# Patient Record
Sex: FEMALE | Race: WHITE | HISPANIC OR LATINO | Employment: UNEMPLOYED | URBAN - METROPOLITAN AREA
[De-identification: names, ages, dates, MRNs, and addresses within clinical notes are randomized per-mention and may not be internally consistent; named-entity substitution may affect disease eponyms.]

---

## 2022-12-13 ENCOUNTER — HOSPITAL ENCOUNTER (EMERGENCY)
Facility: HOSPITAL | Age: 32
Discharge: HOME | End: 2022-12-13
Attending: EMERGENCY MEDICINE

## 2022-12-13 ENCOUNTER — APPOINTMENT (EMERGENCY)
Dept: CT IMAGING | Facility: HOSPITAL | Age: 32
End: 2022-12-13

## 2022-12-13 VITALS
DIASTOLIC BLOOD PRESSURE: 76 MMHG | RESPIRATION RATE: 16 BRPM | SYSTOLIC BLOOD PRESSURE: 118 MMHG | HEART RATE: 80 BPM | OXYGEN SATURATION: 100 % | TEMPERATURE: 98.7 F

## 2022-12-13 DIAGNOSIS — R31.29 MICROSCOPIC HEMATURIA: ICD-10-CM

## 2022-12-13 DIAGNOSIS — N20.0 RIGHT RENAL STONE: Primary | ICD-10-CM

## 2022-12-13 DIAGNOSIS — K76.0 FATTY LIVER: ICD-10-CM

## 2022-12-13 DIAGNOSIS — R10.9 RIGHT FLANK PAIN: ICD-10-CM

## 2022-12-13 LAB
ALBUMIN SERPL BCP-MCNC: 4.1 G/DL (ref 3.5–5)
ALP SERPL-CCNC: 72 U/L (ref 46–116)
ALT SERPL W P-5'-P-CCNC: 42 U/L (ref 12–78)
ANION GAP SERPL CALCULATED.3IONS-SCNC: 6 MMOL/L (ref 4–13)
AST SERPL W P-5'-P-CCNC: 22 U/L (ref 5–45)
BACTERIA UR QL AUTO: ABNORMAL /HPF
BASOPHILS # BLD AUTO: 0.05 THOUSANDS/ÂΜL (ref 0–0.1)
BASOPHILS NFR BLD AUTO: 1 % (ref 0–1)
BILIRUB SERPL-MCNC: 0.25 MG/DL (ref 0.2–1)
BILIRUB UR QL STRIP: NEGATIVE
BUN SERPL-MCNC: 12 MG/DL (ref 5–25)
CALCIUM SERPL-MCNC: 9.1 MG/DL (ref 8.3–10.1)
CHLORIDE SERPL-SCNC: 102 MMOL/L (ref 96–108)
CLARITY UR: CLEAR
CO2 SERPL-SCNC: 29 MMOL/L (ref 21–32)
COLOR UR: YELLOW
CREAT SERPL-MCNC: 0.97 MG/DL (ref 0.6–1.3)
EOSINOPHIL # BLD AUTO: 0.18 THOUSAND/ÂΜL (ref 0–0.61)
EOSINOPHIL NFR BLD AUTO: 2 % (ref 0–6)
ERYTHROCYTE [DISTWIDTH] IN BLOOD BY AUTOMATED COUNT: 13.7 % (ref 11.6–15.1)
EXT PREGNANCY TEST URINE: NEGATIVE
EXT. CONTROL: NORMAL
GFR SERPL CREATININE-BSD FRML MDRD: 76 ML/MIN/1.73SQ M
GLUCOSE SERPL-MCNC: 84 MG/DL (ref 65–140)
GLUCOSE UR STRIP-MCNC: NEGATIVE MG/DL
HCT VFR BLD AUTO: 38.6 % (ref 34.8–46.1)
HGB BLD-MCNC: 12.4 G/DL (ref 11.5–15.4)
HGB UR QL STRIP.AUTO: ABNORMAL
HYALINE CASTS #/AREA URNS LPF: ABNORMAL /LPF
IMM GRANULOCYTES # BLD AUTO: 0.02 THOUSAND/UL (ref 0–0.2)
IMM GRANULOCYTES NFR BLD AUTO: 0 % (ref 0–2)
KETONES UR STRIP-MCNC: NEGATIVE MG/DL
LEUKOCYTE ESTERASE UR QL STRIP: ABNORMAL
LYMPHOCYTES # BLD AUTO: 2.29 THOUSANDS/ÂΜL (ref 0.6–4.47)
LYMPHOCYTES NFR BLD AUTO: 29 % (ref 14–44)
MCH RBC QN AUTO: 27.7 PG (ref 26.8–34.3)
MCHC RBC AUTO-ENTMCNC: 32.1 G/DL (ref 31.4–37.4)
MCV RBC AUTO: 86 FL (ref 82–98)
MONOCYTES # BLD AUTO: 0.63 THOUSAND/ÂΜL (ref 0.17–1.22)
MONOCYTES NFR BLD AUTO: 8 % (ref 4–12)
MUCOUS THREADS UR QL AUTO: ABNORMAL
NEUTROPHILS # BLD AUTO: 4.74 THOUSANDS/ÂΜL (ref 1.85–7.62)
NEUTS SEG NFR BLD AUTO: 60 % (ref 43–75)
NITRITE UR QL STRIP: NEGATIVE
NON-SQ EPI CELLS URNS QL MICRO: ABNORMAL /HPF
NRBC BLD AUTO-RTO: 0 /100 WBCS
PH UR STRIP.AUTO: 6.5 [PH]
PLATELET # BLD AUTO: 362 THOUSANDS/UL (ref 149–390)
PMV BLD AUTO: 8.9 FL (ref 8.9–12.7)
POTASSIUM SERPL-SCNC: 3.9 MMOL/L (ref 3.5–5.3)
PROT SERPL-MCNC: 8.2 G/DL (ref 6.4–8.4)
PROT UR STRIP-MCNC: ABNORMAL MG/DL
RBC # BLD AUTO: 4.48 MILLION/UL (ref 3.81–5.12)
RBC #/AREA URNS AUTO: ABNORMAL /HPF
SODIUM SERPL-SCNC: 137 MMOL/L (ref 135–147)
SP GR UR STRIP.AUTO: 1.03 (ref 1–1.03)
UROBILINOGEN UR STRIP-ACNC: <2 MG/DL
WBC # BLD AUTO: 7.91 THOUSAND/UL (ref 4.31–10.16)
WBC #/AREA URNS AUTO: ABNORMAL /HPF

## 2022-12-13 RX ADMIN — SODIUM CHLORIDE 1000 ML: 0.9 INJECTION, SOLUTION INTRAVENOUS at 10:52

## 2022-12-13 NOTE — ED PROVIDER NOTES
History  Chief Complaint   Patient presents with   • Flank Pain     Right flank pain on and off since June  Hx of same  +nausea     31yo female with no significant past medical history presenting for evaluation of right flank pain  She has been having pain on and off for the past 6 months  Pain seems to worsen if she is dehydrated  Pain is also worse with movement  Pain comes and goes every day or two  She is worried that she has a kidney stone because she has passed two in the past  She also reports that her urine has an odor to it  No fevers, chills, vomiting, abdominal pain  History provided by:  Patient   used: No    Flank Pain  Pain location:  R flank  Pain quality: aching    Pain radiates to:  Does not radiate  Pain severity:  Moderate  Onset quality:  Gradual  Duration: 6 months  Timing:  Intermittent  Progression:  Worsening  Chronicity:  New  Relieved by:  Nothing  Exacerbated by: Being dehydrated  Associated symptoms: nausea    Associated symptoms: no chest pain, no chills, no constipation, no diarrhea, no dysuria, no fever, no shortness of breath and no vomiting        None       History reviewed  No pertinent past medical history  History reviewed  No pertinent surgical history  History reviewed  No pertinent family history  I have reviewed and agree with the history as documented  E-Cigarette/Vaping     E-Cigarette/Vaping Substances     Social History     Tobacco Use   • Smoking status: Never   • Smokeless tobacco: Never   Substance Use Topics   • Alcohol use: Not Currently   • Drug use: Never       Review of Systems   Constitutional: Negative for chills and fever  HENT: Negative for drooling and voice change  Eyes: Negative for discharge and redness  Respiratory: Negative for shortness of breath and stridor  Cardiovascular: Negative for chest pain and leg swelling  Gastrointestinal: Positive for nausea  Negative for constipation, diarrhea and vomiting  Genitourinary: Positive for flank pain  Negative for dysuria  +urine odor   Musculoskeletal: Negative for neck pain and neck stiffness  Skin: Negative for color change and rash  Neurological: Negative for seizures and syncope  Psychiatric/Behavioral: Negative for confusion  The patient is not nervous/anxious  All other systems reviewed and are negative  Physical Exam  Physical Exam  Vitals and nursing note reviewed  Constitutional:       General: She is not in acute distress  Appearance: She is well-developed  She is not diaphoretic  HENT:      Head: Normocephalic and atraumatic  Right Ear: External ear normal       Left Ear: External ear normal       Nose: Nose normal    Eyes:      General: No scleral icterus  Right eye: No discharge  Left eye: No discharge  Conjunctiva/sclera: Conjunctivae normal    Cardiovascular:      Rate and Rhythm: Normal rate and regular rhythm  Heart sounds: Normal heart sounds  No murmur heard  Pulmonary:      Effort: Pulmonary effort is normal  No respiratory distress  Breath sounds: Normal breath sounds  No stridor  No wheezing or rales  Abdominal:      General: Bowel sounds are normal  There is no distension  Palpations: Abdomen is soft  Tenderness: There is no abdominal tenderness  There is right CVA tenderness  There is no guarding  Musculoskeletal:         General: No deformity  Normal range of motion  Cervical back: Normal range of motion and neck supple  Lymphadenopathy:      Cervical: No cervical adenopathy  Skin:     General: Skin is warm and dry  Neurological:      Mental Status: She is alert  She is not disoriented  GCS: GCS eye subscore is 4  GCS verbal subscore is 5  GCS motor subscore is 6     Psychiatric:         Mood and Affect: Mood normal          Behavior: Behavior normal          Vital Signs  ED Triage Vitals [12/13/22 1026]   Temperature Pulse Respirations Blood Pressure SpO2   98 7 °F (37 1 °C) 81 17 110/76 98 %      Temp Source Heart Rate Source Patient Position - Orthostatic VS BP Location FiO2 (%)   Oral Monitor Sitting Left arm --      Pain Score       6           Vitals:    12/13/22 1026 12/13/22 1216   BP: 110/76 118/76   Pulse: 81 80   Patient Position - Orthostatic VS: Sitting          Visual Acuity      ED Medications  Medications   sodium chloride 0 9 % bolus 1,000 mL (0 mL Intravenous Stopped 12/13/22 1152)       Diagnostic Studies  Results Reviewed     Procedure Component Value Units Date/Time    Urine Microscopic [[id number]]  (Abnormal) Collected: 12/13/22 1038    Lab Status: Final result Specimen: Urine, Clean Catch Updated: 12/13/22 1135     RBC, UA Innumerable /hpf      WBC, UA None Seen /hpf      Epithelial Cells Occasional /hpf      Bacteria, UA Occasional /hpf      MUCUS THREADS Occasional     Hyaline Casts, UA 0-3 /lpf     UA w Reflex to Microscopic w Reflex to Culture [[id number]]  (Abnormal) Collected: 12/13/22 1038    Lab Status: Final result Specimen: Urine, Clean Catch Updated: 12/13/22 1124     Color, UA Yellow     Clarity, UA Clear     Specific Gravity, UA 1 026     pH, UA 6 5     Leukocytes, UA Trace     Nitrite, UA Negative     Protein, UA 30 (1+) mg/dl      Glucose, UA Negative mg/dl      Ketones, UA Negative mg/dl      Urobilinogen, UA <2 0 mg/dl      Bilirubin, UA Negative     Occult Blood, UA Large    Comprehensive metabolic panel [[id number]] Collected: 12/13/22 1039    Lab Status: Final result Specimen: Blood from Arm, Right Updated: 12/13/22 1122     Sodium 137 mmol/L      Potassium 3 9 mmol/L      Chloride 102 mmol/L      CO2 29 mmol/L      ANION GAP 6 mmol/L      BUN 12 mg/dL      Creatinine 0 97 mg/dL      Glucose 84 mg/dL      Calcium 9 1 mg/dL      AST 22 U/L      ALT 42 U/L      Alkaline Phosphatase 72 U/L      Total Protein 8 2 g/dL      Albumin 4 1 g/dL      Total Bilirubin 0 25 mg/dL      eGFR 76 ml/min/1 73sq m     Narrative: National Kidney Disease Foundation guidelines for Chronic Kidney Disease (CKD):   •  Stage 1 with normal or high GFR (GFR > 90 mL/min/1 73 square meters)  •  Stage 2 Mild CKD (GFR = 60-89 mL/min/1 73 square meters)  •  Stage 3A Moderate CKD (GFR = 45-59 mL/min/1 73 square meters)  •  Stage 3B Moderate CKD (GFR = 30-44 mL/min/1 73 square meters)  •  Stage 4 Severe CKD (GFR = 15-29 mL/min/1 73 square meters)  •  Stage 5 End Stage CKD (GFR <15 mL/min/1 73 square meters)  Note: GFR calculation is accurate only with a steady state creatinine    CBC and differential [[id number]] Collected: 12/13/22 1039    Lab Status: Final result Specimen: Blood from Arm, Right Updated: 12/13/22 1102     WBC 7 91 Thousand/uL      RBC 4 48 Million/uL      Hemoglobin 12 4 g/dL      Hematocrit 38 6 %      MCV 86 fL      MCH 27 7 pg      MCHC 32 1 g/dL      RDW 13 7 %      MPV 8 9 fL      Platelets 365 Thousands/uL      nRBC 0 /100 WBCs      Neutrophils Relative 60 %      Immat GRANS % 0 %      Lymphocytes Relative 29 %      Monocytes Relative 8 %      Eosinophils Relative 2 %      Basophils Relative 1 %      Neutrophils Absolute 4 74 Thousands/µL      Immature Grans Absolute 0 02 Thousand/uL      Lymphocytes Absolute 2 29 Thousands/µL      Monocytes Absolute 0 63 Thousand/µL      Eosinophils Absolute 0 18 Thousand/µL      Basophils Absolute 0 05 Thousands/µL     POCT pregnancy, urine [[id number]]  (Normal) Resulted: 12/13/22 1051    Lab Status: Final result Updated: 12/13/22 1051     EXT Preg Test, Ur Negative     Control Valid                 CT renal stone study abdomen pelvis wo contrast   Final Result by Danilo Leonardo MD (12/13 1131)      1   11 mm right renal pelvic calculus without hydronephrosis  2   Small nonobstructing left renal calculi  3   Severe diffuse hepatic steatosis  4   Mild thickening of the endometrium which is likely physiologic and correlation with phase of menstrual cycle is recommended    If there is clinical concern for endometrial pathology, follow-up pelvic ultrasound is recommended  Workstation performed: [id number]                    Procedures  Procedures         ED Course  ED Course as of 12/13/22 1646   Tue Dec 13, 2022   1157 Urology paged  1202 Discussed case with Kendra Blizzard, the on call urology AP  Creatinine normal, no WBC in urine, pain controlled  Patient may be discharged with close f/u in office  MDM  Number of Diagnoses or Management Options  Fatty liver: new and requires workup  Microscopic hematuria: new and requires workup  Right flank pain: new and requires workup  Right renal stone: new and requires workup  Diagnosis management comments: 32yoF presenting for intermittent R flank pain x several months  Also having urine odor  Hx of kidney stones  She is afebrile and hemodynamically stable  Patient is well appearing in no distress  +R CVA tenderness on exam     Initial ED plan: Check CBC, CMP, UA, and CT renal stone protocol  IV fluid bolus  She declines analgesics  Final assessment: Labs unremarkable including normal white count and renal function  UA with microscopic hematuria without signs of infection  CT reveals an 11mm stone in the right renal pelvis without hydronephrosis  There is also a mild thickening of the endometrium seen  Patient is due to get her period in the next day or two, suspect this is related to her menstrual cycle  Case was discussed with the urology team who recommended outpatient f/u for definitive management  Test results and recommendations were discussed with patient  Advised f/u with urology, OBGYN, and PCP  ED return precautions discussed  Patient expressed understanding and is agreeable to plan  Patient discharged in stable condition           Amount and/or Complexity of Data Reviewed  Clinical lab tests: ordered and reviewed  Tests in the radiology section of CPT®: ordered and reviewed  Review and summarize past medical records: yes  Independent visualization of images, tracings, or specimens: yes    Risk of Complications, Morbidity, and/or Mortality  Presenting problems: moderate  Diagnostic procedures: moderate  Management options: moderate    Patient Progress  Patient progress: stable      Disposition  Final diagnoses:   Right renal stone   Right flank pain   Microscopic hematuria   Fatty liver     Time reflects when diagnosis was documented in both MDM as applicable and the Disposition within this note     Time User Action Codes Description Comment    12/13/2022 12:07  [employer] [place] [N20 0] Right renal stone     12/13/2022 12:07  [employer], [facility] Pamela [R10 9] Right flank pain     12/13/2022 12:07  [employer], [facility] Pamela [R31 29] Microscopic hematuria     12/13/2022  4:46  [employer], [facility] Pamela [K76 0] Fatty liver       ED Disposition     ED Disposition   Discharge    Condition   Stable    Date/Time   Tue Dec 13, 2022 12:07 PM    Nena Bennett 104 discharge to home/self care                 Follow-up Information     Follow up With Specialties Details Why Contact Info Additional [address] For Urology [facility] Urology Schedule an appointment as soon as possible for a visit   [address]  1121 [facility] [id number]  [address] [place] [address] For Urology [address]  [address], [address] [facility] [address]    [address] Obstetrics and Gynecology Schedule an appointment as soon as possible for a visit   Lyndsay SMITH 330  [address]  Obstetrics & Gynecology [address], C/SADIE Almanza, [address] DAREK Wallace Nurse Practitioner Schedule an appointment as soon as possible for a visit  Call to establish a family doctor for follow-up Viktoriya Riddle [address] [place] [phone]       Jeffy Armstrong [facility] Emergency Department Emergency Medicine  If symptoms worsen [address] [place] [id number] [facility] Emergency Department, [address] [place] [address], [facility]          There are no discharge medications for this patient            PDMP Review     None          ED Provider  Electronically Signed by           Ash Patton PA-C  12/13/22 9783

## 2022-12-13 NOTE — DISCHARGE INSTRUCTIONS
Take Tylenol and ibuprofen as needed for pain  Drink plenty of fluids  Please call today to schedule a follow-up with urology  Return to the ER with any worsening symptoms or uncontrolled pain  You will also need to follow-up with a family doctor for the fatty liver seen on your CT and an OBGYN for the thickened uterine lining on imaging

## 2022-12-19 ENCOUNTER — OFFICE VISIT (OUTPATIENT)
Dept: UROLOGY | Facility: CLINIC | Age: 32
End: 2022-12-19

## 2022-12-19 VITALS
HEART RATE: 62 BPM | WEIGHT: 180 LBS | SYSTOLIC BLOOD PRESSURE: 110 MMHG | HEIGHT: 64 IN | OXYGEN SATURATION: 99 % | DIASTOLIC BLOOD PRESSURE: 84 MMHG | BODY MASS INDEX: 30.73 KG/M2

## 2022-12-19 DIAGNOSIS — N20.0 RECURRENT NEPHROLITHIASIS: Primary | ICD-10-CM

## 2022-12-19 DIAGNOSIS — N20.0 RIGHT RENAL STONE: ICD-10-CM

## 2022-12-19 NOTE — PROGRESS NOTES
12/19/2022      Chief Complaint   Patient presents with   • New Patient Visit       Assessment and Plan    1  Right renal pelvic stone  2  Intermittent right flank pain  - CT stone study (12/13/22) showing 11 mm right renal pelvis stone without hydronephrosis  - Discussed renal pelvis stone with potential intermittent obstruction causing intermittent pain  Discussed observation vs ureteroscopy  Will proceed with surgery given intermittent symptoms that are occasionally severe  Discussed procedure details, risks, and expected post-operative course  Discussed need for high powered laser and possible need for second staged procedure  She is agreeable  Surgical consent signed  Case requested  Will need urine culture 2-3 weeks prior to procedure  - Referral to nephrology provided for metabolic evaluation    History of Present Illness  Mara Raza is a 34 y o  female here for new patient evaluation of nephrolithiasis  Patient was seen in the emergency room (12/13/2022) for right-sided flank pain  CT at that time showing 11 mm right renal pelvis stone without hydronephrosis  Normal kidney function at that time  Patient states she has intermittent right flank and abdominal pain since June of this year  Denies nausea, vomiting, fever, chills, dysuria or hematuria  Reports prior history of kidney stone with spontaneous expulsion in 2018  Denies prior need for  surgical manipulation  Denies issues with anesthesia in the past  No significant medical history  Unable to provide UA    Review of Systems   Constitutional: Negative for chills and fever  Respiratory: Negative for shortness of breath  Cardiovascular: Negative for chest pain  Gastrointestinal: Negative for abdominal pain, nausea and vomiting  Genitourinary: Positive for flank pain  Negative for difficulty urinating, dysuria, frequency, hematuria and urgency  Musculoskeletal: Negative for back pain  Neurological: Negative for dizziness  Past Medical History  History reviewed  No pertinent past medical history  Past Social History  Past Surgical History:   Procedure Laterality Date   • WISDOM TOOTH EXTRACTION Bilateral 2008     Social History     Tobacco Use   Smoking Status Never   Smokeless Tobacco Never       Past Family History  History reviewed  No pertinent family history  Past Social history  Social History     Socioeconomic History   • Marital status: Single     Spouse name: Not on file   • Number of children: Not on file   • Years of education: Not on file   • Highest education level: Not on file   Occupational History   • Not on file   Tobacco Use   • Smoking status: Never   • Smokeless tobacco: Never   Vaping Use   • Vaping Use: Never used   Substance and Sexual Activity   • Alcohol use: Not Currently   • Drug use: Never   • Sexual activity: Not on file   Other Topics Concern   • Not on file   Social History Narrative   • Not on file     Social Determinants of Health     Financial Resource Strain: Not on file   Food Insecurity: Not on file   Transportation Needs: Not on file   Physical Activity: Not on file   Stress: Not on file   Social Connections: Not on file   Intimate Partner Violence: Not on file   Housing Stability: Not on file       Current Medications  No current outpatient medications on file  No current facility-administered medications for this visit  Allergies  No Known Allergies      The following portions of the patient's history were reviewed and updated as appropriate: allergies, current medications, past medical history, past social history, past surgical history and problem list       Vitals  Vitals:    12/19/22 0919   BP: 110/84   BP Location: Left arm   Patient Position: Sitting   Cuff Size: Large   Pulse: 62   SpO2: 99%   Weight: 81 6 kg (180 lb)   Height: 5' 3 5" (1 613 m)           Physical Exam  Physical Exam  Constitutional:       Appearance: Normal appearance     HENT:      Head: Normocephalic and atraumatic  Right Ear: External ear normal       Left Ear: External ear normal       Nose: Nose normal    Eyes:      General: No scleral icterus  Conjunctiva/sclera: Conjunctivae normal    Cardiovascular:      Rate and Rhythm: Normal rate and regular rhythm  Pulses: Normal pulses  Heart sounds: Normal heart sounds  Pulmonary:      Effort: Pulmonary effort is normal       Breath sounds: Normal breath sounds  Abdominal:      General: Abdomen is flat  Bowel sounds are normal       Palpations: Abdomen is soft  Tenderness: There is no abdominal tenderness  There is no right CVA tenderness, left CVA tenderness or guarding  Musculoskeletal:         General: Normal range of motion  Cervical back: Normal range of motion  Skin:     General: Skin is warm and dry  Neurological:      General: No focal deficit present  Mental Status: She is alert and oriented to person, place, and time  Psychiatric:         Mood and Affect: Mood normal          Behavior: Behavior normal          Thought Content: Thought content normal          Judgment: Judgment normal            Results  No results found for this or any previous visit (from the past 1 hour(s))  ]  No results found for: PSA  Lab Results   Component Value Date    CALCIUM 9 1 12/13/2022    K 3 9 12/13/2022    CO2 29 12/13/2022     12/13/2022    BUN 12 12/13/2022    CREATININE 0 97 12/13/2022     Lab Results   Component Value Date    WBC 7 91 12/13/2022    HGB 12 4 12/13/2022    HCT 38 6 12/13/2022    MCV 86 12/13/2022     12/13/2022           Orders  No orders of the defined types were placed in this encounter      Parul Grullon

## 2023-01-03 NOTE — PROGRESS NOTES
NEPHROLOGY OFFICE CONSULTATION NOTE    Patient: Gaynell Baumgarten               Sex: female           YOB: 1990        Age:  34 y o        1/12/2023      BACKGROUND     I had the pleasure of seeing Gaynell Baumgarten in the nephrology office today for consultation  She has a past medical history significant for recurrent nephrolithiasis  She was referred to our office by Tucker Tyler PA-C for further work-up of recurrent nephrolithiasis  She recently presented to the [address] [place] [address] emergency department on 12/13/2022 after development of right flank pain that has been on and off since June  Underwent a CT stone study which showed an 11 mm right renal pelvis stone without evidence of hydronephrosis  She was recently seen on 12/19 by the urology office with plan for surgical management of existing stone in February  Presents for consultation today  Several years ago, she had passed 1-2 kidney stones  Was doing well until recently, when she developed acute right-sided flank pain and was found to have a large kidney stone  Reports a strong family history of chronic kidney disease  Also has an aunt who had kidney stones in the past   Reports she does eat a high salt diet and has had issues with dehydration and poor water intake  She is having intermittent right-sided flank pain, worse with exertion  Reports pain is manageable with over-the-counter Tylenol and ibuprofen  Most recent labs were obtained on 12/13/2022, which we reviewed together  SUBJECTIVE     She currently has no complaints at this time and is feeling well  Patient denies any chest pain, shortness of breath, or swelling  REVIEW OF SYSTEMS     Review of Systems   Constitutional: Negative for activity change, chills, fatigue and fever  HENT: Negative for hearing loss, nosebleeds and trouble swallowing  Respiratory: Negative for cough and shortness of breath      Cardiovascular: Negative for chest pain and leg swelling  Gastrointestinal: Negative for constipation, diarrhea, nausea and vomiting  Genitourinary: Positive for flank pain (intermittent)  Negative for difficulty urinating, dysuria, frequency and hematuria  Musculoskeletal: Negative for back pain  Skin: Negative for pallor  Neurological: Negative for dizziness, syncope, weakness and light-headedness  Psychiatric/Behavioral: Negative for confusion and sleep disturbance  The patient is not nervous/anxious  OBJECTIVE     Current Weight: Weight - Scale: 87 2 kg (192 lb 3 2 oz)  Vitals:    01/12/23 1313   BP: 108/74   Pulse: 74   Resp: 16   Temp: 97 8 °F (36 6 °C)   SpO2: 98%     Body mass index is 33 51 kg/m²  CURRENT MEDICATIONS     No current outpatient medications on file  PHYSICAL EXAMINATION     Physical Exam  Vitals reviewed  Constitutional:       General: She is not in acute distress  HENT:      Head: Normocephalic  Mouth/Throat:      Lips: Pink  Mouth: Mucous membranes are moist    Eyes:      General: Lids are normal  No scleral icterus  Cardiovascular:      Rate and Rhythm: Normal rate and regular rhythm  Heart sounds: S1 normal and S2 normal    Pulmonary:      Effort: Pulmonary effort is normal  No accessory muscle usage or respiratory distress  Breath sounds: Normal breath sounds  Abdominal:      General: There is no distension  Tenderness: There is no abdominal tenderness  Musculoskeletal:      Cervical back: Normal range of motion and neck supple  No tenderness  Right lower leg: No edema  Left lower leg: No edema  Skin:     General: Skin is warm  Coloration: Skin is not cyanotic or jaundiced  Neurological:      General: No focal deficit present  Mental Status: She is alert and oriented to person, place, and time  Psychiatric:         Attention and Perception: Attention normal          Speech: Speech normal          Behavior: Behavior is cooperative             LAB RESULTS BMP 12/13/2022:  Sodium: 137  Potassium: 3 9  Chloride: 102  CO2: 29  Anion gap: 6  BUN: 12  Creatinine: 0 97  Calcium: 9 1  Albumin: 4 1  eGFR: 76    RADIOLOGY RESULTS      CT renal stone study 12/13/2022:  11 mm right renal pelvic calculus without evidence of hydronephrosis  Small nonobstructing left renal calculi  ASSESSMENT/PLAN     Recurrent nephrolithiasis:  Most recent CT stone study showed an 11 mm nonobstructing right renal pelvis stone without hydronephrosis, current renal function stable  Recently seen by the urology office with plans for surgical stone removal next month  Reports a prior history of kidney stones with spontaneous expulsion dating back to 2018  She is currently not on any medications, over-the-counter vitamins, or supplements  Last recent lab work obtained on 12/13 showed a creatinine level of 0 97 and EGFR of 76  Most recent urinalysis showed the presence of hematuria, pyuria, and proteinuria, likely in the setting of kidney stone  Will plan to obtain 24-hour Litholink for metabolic stone work-up  We will also check BMP, urinalysis, and uric acid level prior to follow-up office visit  Recommend nephrology follow-up in 4 months, complete Litholink prior to visit  Eusebio Vivar  Nephrology  1/12/2023      Portions of the record may have been created with voice recognition software  Occasional wrong word or "sound a like" substitutions may have occurred due to the inherent limitations of voice recognition software  Read the chart carefully and recognize, using context, where substitutions have occurred

## 2023-01-10 ENCOUNTER — TELEPHONE (OUTPATIENT)
Dept: UROLOGY | Facility: CLINIC | Age: 33
End: 2023-01-10

## 2023-01-10 ENCOUNTER — PREP FOR PROCEDURE (OUTPATIENT)
Dept: UROLOGY | Facility: CLINIC | Age: 33
End: 2023-01-10

## 2023-01-10 DIAGNOSIS — N20.0 CALCULUS OF KIDNEY: Primary | ICD-10-CM

## 2023-01-10 NOTE — TELEPHONE ENCOUNTER
Spoke w patient and she is sched for 2/16 at the [facility]  I did inform her if we get any cancellations in [place] I will move her to a sooner date  She is aware she needs a  and hospital will contact her day prior w time of arrival  She will go for UCX week of 2/1 and advised 7 day hold of asprin products, multivitamins and fish oils  We will mail her a surgical packet , ED precautions to [facility] advised and she will call us if she has any questions or concerns

## 2023-01-11 ENCOUNTER — TELEPHONE (OUTPATIENT)
Dept: NEPHROLOGY | Facility: CLINIC | Age: 33
End: 2023-01-11

## 2023-01-11 PROBLEM — N20.0 NEPHROLITHIASIS: Status: ACTIVE | Noted: 2023-01-11

## 2023-01-12 ENCOUNTER — CONSULT (OUTPATIENT)
Dept: NEPHROLOGY | Facility: CLINIC | Age: 33
End: 2023-01-12

## 2023-01-12 VITALS
DIASTOLIC BLOOD PRESSURE: 74 MMHG | RESPIRATION RATE: 16 BRPM | BODY MASS INDEX: 32.81 KG/M2 | HEIGHT: 64 IN | TEMPERATURE: 97.8 F | SYSTOLIC BLOOD PRESSURE: 108 MMHG | HEART RATE: 74 BPM | OXYGEN SATURATION: 98 % | WEIGHT: 192.2 LBS

## 2023-01-12 DIAGNOSIS — N20.0 NEPHROLITHIASIS: Primary | ICD-10-CM

## 2023-01-12 DIAGNOSIS — N20.0 RECURRENT NEPHROLITHIASIS: ICD-10-CM

## 2023-02-13 ENCOUNTER — APPOINTMENT (OUTPATIENT)
Dept: LAB | Facility: CLINIC | Age: 33
End: 2023-02-13

## 2023-02-13 DIAGNOSIS — N20.0 CALCULUS OF KIDNEY: ICD-10-CM

## 2023-02-15 LAB — BACTERIA UR CULT: NORMAL

## 2023-02-16 ENCOUNTER — APPOINTMENT (OUTPATIENT)
Dept: RADIOLOGY | Facility: AMBULARY SURGERY CENTER | Age: 33
End: 2023-02-16

## 2023-02-16 ENCOUNTER — HOSPITAL ENCOUNTER (OUTPATIENT)
Facility: AMBULARY SURGERY CENTER | Age: 33
Setting detail: OUTPATIENT SURGERY
Discharge: HOME | End: 2023-02-16
Attending: UROLOGY | Admitting: UROLOGY

## 2023-02-16 ENCOUNTER — TELEPHONE (OUTPATIENT)
Dept: UROLOGY | Facility: CLINIC | Age: 33
End: 2023-02-16

## 2023-02-16 ENCOUNTER — ANESTHESIA EVENT (OUTPATIENT)
Dept: PERIOP | Facility: AMBULARY SURGERY CENTER | Age: 33
End: 2023-02-16

## 2023-02-16 ENCOUNTER — ANESTHESIA (OUTPATIENT)
Dept: PERIOP | Facility: AMBULARY SURGERY CENTER | Age: 33
End: 2023-02-16

## 2023-02-16 VITALS
SYSTOLIC BLOOD PRESSURE: 106 MMHG | HEART RATE: 79 BPM | TEMPERATURE: 97 F | DIASTOLIC BLOOD PRESSURE: 57 MMHG | BODY MASS INDEX: 33.13 KG/M2 | WEIGHT: 190 LBS | RESPIRATION RATE: 18 BRPM | OXYGEN SATURATION: 100 %

## 2023-02-16 DIAGNOSIS — N20.0 NEPHROLITHIASIS: Primary | ICD-10-CM

## 2023-02-16 DEVICE — STENT URETERAL 6FR 24CML INLAY OPTIMA: Type: IMPLANTABLE DEVICE | Status: FUNCTIONAL

## 2023-02-16 RX ORDER — ACETAMINOPHEN 500 MG
500 TABLET ORAL EVERY 6 HOURS
Qty: 20 TABLET | Refills: 0 | Status: SHIPPED | OUTPATIENT
Start: 2023-02-16 | End: 2023-02-21

## 2023-02-16 RX ORDER — FENTANYL CITRATE/PF 50 MCG/ML
25 SYRINGE (ML) INJECTION
Status: DISCONTINUED | OUTPATIENT
Start: 2023-02-16 | End: 2023-02-16 | Stop reason: HOSPADM

## 2023-02-16 RX ORDER — PHENAZOPYRIDINE HYDROCHLORIDE 100 MG/1
100 TABLET, FILM COATED ORAL
Status: DISCONTINUED | OUTPATIENT
Start: 2023-02-16 | End: 2023-02-16 | Stop reason: HOSPADM

## 2023-02-16 RX ORDER — DEXAMETHASONE SODIUM PHOSPHATE 10 MG/ML
INJECTION, SOLUTION INTRAMUSCULAR; INTRAVENOUS AS NEEDED
Status: DISCONTINUED | OUTPATIENT
Start: 2023-02-16 | End: 2023-02-16

## 2023-02-16 RX ORDER — HYDROMORPHONE HCL/PF 1 MG/ML
0.2 SYRINGE (ML) INJECTION
Status: DISCONTINUED | OUTPATIENT
Start: 2023-02-16 | End: 2023-02-16 | Stop reason: HOSPADM

## 2023-02-16 RX ORDER — SODIUM CHLORIDE, SODIUM LACTATE, POTASSIUM CHLORIDE, CALCIUM CHLORIDE 600; 310; 30; 20 MG/100ML; MG/100ML; MG/100ML; MG/100ML
125 INJECTION, SOLUTION INTRAVENOUS CONTINUOUS
Status: DISCONTINUED | OUTPATIENT
Start: 2023-02-16 | End: 2023-02-16 | Stop reason: HOSPADM

## 2023-02-16 RX ORDER — TAMSULOSIN HYDROCHLORIDE 0.4 MG/1
0.4 CAPSULE ORAL
Qty: 30 CAPSULE | Refills: 0 | Status: SHIPPED | OUTPATIENT
Start: 2023-02-16

## 2023-02-16 RX ORDER — ONDANSETRON 4 MG/1
4 TABLET, FILM COATED ORAL EVERY 8 HOURS PRN
Qty: 20 TABLET | Refills: 0 | Status: SHIPPED | OUTPATIENT
Start: 2023-02-16

## 2023-02-16 RX ORDER — DICLOFENAC POTASSIUM 50 MG/1
50 TABLET, FILM COATED ORAL 2 TIMES DAILY
Qty: 10 TABLET | Refills: 0 | Status: SHIPPED | OUTPATIENT
Start: 2023-02-16 | End: 2023-02-17 | Stop reason: SDUPTHER

## 2023-02-16 RX ORDER — ACETAMINOPHEN 325 MG/1
650 TABLET ORAL EVERY 6 HOURS PRN
Status: DISCONTINUED | OUTPATIENT
Start: 2023-02-16 | End: 2023-02-16 | Stop reason: HOSPADM

## 2023-02-16 RX ORDER — ONDANSETRON 2 MG/ML
4 INJECTION INTRAMUSCULAR; INTRAVENOUS ONCE AS NEEDED
Status: DISCONTINUED | OUTPATIENT
Start: 2023-02-16 | End: 2023-02-16 | Stop reason: HOSPADM

## 2023-02-16 RX ORDER — OXYCODONE HYDROCHLORIDE 5 MG/1
5 TABLET ORAL EVERY 4 HOURS PRN
Status: DISCONTINUED | OUTPATIENT
Start: 2023-02-16 | End: 2023-02-16 | Stop reason: HOSPADM

## 2023-02-16 RX ORDER — OXYCODONE HYDROCHLORIDE 5 MG/1
10 TABLET ORAL EVERY 4 HOURS PRN
Status: DISCONTINUED | OUTPATIENT
Start: 2023-02-16 | End: 2023-02-16 | Stop reason: HOSPADM

## 2023-02-16 RX ORDER — ONDANSETRON 2 MG/ML
INJECTION INTRAMUSCULAR; INTRAVENOUS AS NEEDED
Status: DISCONTINUED | OUTPATIENT
Start: 2023-02-16 | End: 2023-02-16

## 2023-02-16 RX ORDER — HYDROMORPHONE HCL/PF 1 MG/ML
0.25 SYRINGE (ML) INJECTION
Status: DISCONTINUED | OUTPATIENT
Start: 2023-02-16 | End: 2023-02-16 | Stop reason: HOSPADM

## 2023-02-16 RX ORDER — OXYBUTYNIN CHLORIDE 10 MG/1
10 TABLET, EXTENDED RELEASE ORAL
Qty: 7 TABLET | Refills: 0 | Status: SHIPPED | OUTPATIENT
Start: 2023-02-16 | End: 2023-02-17 | Stop reason: SDUPTHER

## 2023-02-16 RX ORDER — SODIUM CHLORIDE, SODIUM LACTATE, POTASSIUM CHLORIDE, CALCIUM CHLORIDE 600; 310; 30; 20 MG/100ML; MG/100ML; MG/100ML; MG/100ML
INJECTION, SOLUTION INTRAVENOUS CONTINUOUS PRN
Status: DISCONTINUED | OUTPATIENT
Start: 2023-02-16 | End: 2023-02-16

## 2023-02-16 RX ORDER — FENTANYL CITRATE 50 UG/ML
INJECTION, SOLUTION INTRAMUSCULAR; INTRAVENOUS AS NEEDED
Status: DISCONTINUED | OUTPATIENT
Start: 2023-02-16 | End: 2023-02-16

## 2023-02-16 RX ORDER — DIPHENHYDRAMINE HCL 25 MG
12.5 TABLET ORAL EVERY 6 HOURS PRN
Status: DISCONTINUED | OUTPATIENT
Start: 2023-02-16 | End: 2023-02-16 | Stop reason: HOSPADM

## 2023-02-16 RX ORDER — LIDOCAINE HYDROCHLORIDE 20 MG/ML
INJECTION, SOLUTION EPIDURAL; INFILTRATION; INTRACAUDAL; PERINEURAL AS NEEDED
Status: DISCONTINUED | OUTPATIENT
Start: 2023-02-16 | End: 2023-02-16

## 2023-02-16 RX ORDER — SULFAMETHOXAZOLE AND TRIMETHOPRIM 800; 160 MG/1; MG/1
1 TABLET ORAL EVERY 12 HOURS SCHEDULED
Qty: 6 TABLET | Refills: 0 | Status: SHIPPED | OUTPATIENT
Start: 2023-02-16 | End: 2023-02-19

## 2023-02-16 RX ORDER — CEFAZOLIN SODIUM 2 G/50ML
2000 SOLUTION INTRAVENOUS ONCE
Status: COMPLETED | OUTPATIENT
Start: 2023-02-16 | End: 2023-02-16

## 2023-02-16 RX ORDER — PROPOFOL 10 MG/ML
INJECTION, EMULSION INTRAVENOUS AS NEEDED
Status: DISCONTINUED | OUTPATIENT
Start: 2023-02-16 | End: 2023-02-16

## 2023-02-16 RX ORDER — ONDANSETRON 2 MG/ML
4 INJECTION INTRAMUSCULAR; INTRAVENOUS EVERY 6 HOURS PRN
Status: DISCONTINUED | OUTPATIENT
Start: 2023-02-16 | End: 2023-02-16 | Stop reason: HOSPADM

## 2023-02-16 RX ORDER — MIDAZOLAM HYDROCHLORIDE 2 MG/2ML
INJECTION, SOLUTION INTRAMUSCULAR; INTRAVENOUS AS NEEDED
Status: DISCONTINUED | OUTPATIENT
Start: 2023-02-16 | End: 2023-02-16

## 2023-02-16 RX ADMIN — ONDANSETRON 4 MG: 2 INJECTION INTRAMUSCULAR; INTRAVENOUS at 13:53

## 2023-02-16 RX ADMIN — DEXAMETHASONE SODIUM PHOSPHATE 10 MG: 10 INJECTION, SOLUTION INTRAMUSCULAR; INTRAVENOUS at 13:53

## 2023-02-16 RX ADMIN — LIDOCAINE HYDROCHLORIDE 100 MG: 20 INJECTION, SOLUTION EPIDURAL; INFILTRATION; INTRACAUDAL at 13:53

## 2023-02-16 RX ADMIN — SODIUM CHLORIDE, SODIUM LACTATE, POTASSIUM CHLORIDE, AND CALCIUM CHLORIDE: .6; .31; .03; .02 INJECTION, SOLUTION INTRAVENOUS at 13:27

## 2023-02-16 RX ADMIN — OXYCODONE 10 MG: 5 TABLET ORAL at 16:06

## 2023-02-16 RX ADMIN — FENTANYL CITRATE 25 MCG: 50 INJECTION INTRAMUSCULAR; INTRAVENOUS at 15:02

## 2023-02-16 RX ADMIN — FENTANYL CITRATE 25 MCG: 50 INJECTION, SOLUTION INTRAMUSCULAR; INTRAVENOUS at 14:17

## 2023-02-16 RX ADMIN — ONDANSETRON 4 MG: 2 INJECTION INTRAMUSCULAR; INTRAVENOUS at 15:43

## 2023-02-16 RX ADMIN — FENTANYL CITRATE 25 MCG: 50 INJECTION, SOLUTION INTRAMUSCULAR; INTRAVENOUS at 14:30

## 2023-02-16 RX ADMIN — MIDAZOLAM HYDROCHLORIDE 2 MG: 1 INJECTION, SOLUTION INTRAMUSCULAR; INTRAVENOUS at 13:46

## 2023-02-16 RX ADMIN — CEFAZOLIN SODIUM 2000 MG: 2 SOLUTION INTRAVENOUS at 13:49

## 2023-02-16 RX ADMIN — PROPOFOL 300 MG: 10 INJECTION, EMULSION INTRAVENOUS at 13:53

## 2023-02-16 RX ADMIN — FENTANYL CITRATE 50 MCG: 50 INJECTION, SOLUTION INTRAMUSCULAR; INTRAVENOUS at 13:53

## 2023-02-16 NOTE — DISCHARGE INSTR - AVS FIRST PAGE
Carol Cervantes had cystoscopy with ureteroscopy and laser lithotripsy  We used a laser to turn your stone into dust and small bits  You will notice that you passed sand and small stone fragments  You will have some blood in the urine and some urgency and frequency  You may have the sensation that you need to urinate  We have given you medications to make this less bothersome to you  I will have the office call you to arrange for cystoscopy and ureteral stent removal in the next 3 weeks or so  Long-term, please be sure to increase your hydration to decrease your chance of stone formation in the future  If you have questions or concerns as you recover, do let us know    I wish you a rapid recovery,  Dr Ashly Hernandez

## 2023-02-16 NOTE — TELEPHONE ENCOUNTER
S/p URS and laser lithotripsy, has a 6 fr x 24 cm right stent   Please arrange cystoscopy and ureteral stent removal in 3 weeks, can then see us 3 months later with a renal u/s and bmp

## 2023-02-16 NOTE — ANESTHESIA POSTPROCEDURE EVALUATION
Post-Op Assessment Note    CV Status:  Stable  Pain Score: 0    Pain management: adequate     Mental Status:  Arousable and sleepy   Hydration Status:  Stable   PONV Controlled:  Controlled   Airway Patency:  Patent      Post Op Vitals Reviewed: Yes      Staff: CRNA         No notable events documented      BP   121/76   Temp 97 6   Pulse 84   Resp 14   SpO2 99

## 2023-02-16 NOTE — ANESTHESIA PREPROCEDURE EVALUATION
Procedure:  CYSTOSCOPY URETEROSCOPY WITH LITHOTRIPSY HOLMIUM LASER, RETROGRADE PYELOGRAM AND INSERTION STENT URETERAL (Right: Bladder)    Relevant Problems   /RENAL   (+) Nephrolithiasis        Physical Exam    Airway    Mallampati score: II  TM Distance: >3 FB  Neck ROM: full     Dental   Comment: Denies loose teeth,     Cardiovascular  Cardiovascular exam normal    Pulmonary  Pulmonary exam normal     Other Findings  Portions of exam deferred due to low yield and/or unknown COVID status      Anesthesia Plan  ASA Score- 2     Anesthesia Type- general with ASA Monitors  Additional Monitors:   Airway Plan: LMA  Plan Factors-Exercise tolerance (METS): >4 METS  Chart reviewed  Existing labs reviewed  Patient summary reviewed  Patient is not a current smoker  Induction- intravenous  Postoperative Plan-     Informed Consent- Anesthetic plan and risks discussed with patient  I personally reviewed this patient with the CRNA  Discussed and agreed on the Anesthesia Plan with the HIPOLITO Hatch

## 2023-02-16 NOTE — H&P
H&P Exam - Urology   Zee Montana 34 y o  female MRN: [id number]  Unit/Bed#: OR [facility] Encounter: [id number]    Assessment/Plan     Assessment:  34year old woman with right nephrolithiasis here for right ureteroscopy with laser lithotripsy and all indicated procedures    Marked on her right arm      Plan:  Proceed to right ureteroscopy with laser lithotripsy     History of Present Illness   HPI:  Zee Montana is a 34 y o  female who presents with a right renal stone in the renal pelvis  Here for ureteroscopic stone treatment as above    Changes in her health since she was last seen include: none    The following portions of the patient's history were reviewed and updated as appropriate: allergies, current medications, past family history, past medical history, past social history, past surgical history and problem list        Review of Systems   Constitutional: Negative  HENT: Negative  Eyes: Negative  Respiratory: Negative  Cardiovascular: Negative  Gastrointestinal: Negative  Endocrine: Negative  Genitourinary: Positive for flank pain (right)  Skin: Negative  Allergic/Immunologic: Negative  Neurological: Negative  Hematological: Negative  Psychiatric/Behavioral: Negative          Historical Information   Past Medical History:   Diagnosis Date   • Kidney stone    • Urinary tract infection      Past Surgical History:   Procedure Laterality Date   • WISDOM TOOTH EXTRACTION Bilateral 2008     Social History   Social History     Substance and Sexual Activity   Alcohol Use Not Currently     Social History     Substance and Sexual Activity   Drug Use Never     Social History     Tobacco Use   Smoking Status Never   Smokeless Tobacco Never     E-Cigarette/Vaping   • E-Cigarette Use Never User      E-Cigarette/Vaping Substances   • Nicotine No    • THC No    • CBD No    • Flavoring No    • Other No    • Unknown No      Family History:   Family History   Problem Relation Age of Onset   • Heart disease Mother    • Seizures Mother    • Asthma Father    • Nephrolithiasis Father        Meds/Allergies   all medications and allergies reviewed  No Known Allergies    Objective   Vitals: Blood pressure 136/81, pulse 81, temperature (!) 97 °F (36 1 °C), temperature source Temporal, resp  rate 16, weight 86 2 kg (190 lb), SpO2 100 %  No intake/output data recorded  Invasive Devices     None                 Physical Exam  Vitals reviewed  Constitutional:       General: She is not in acute distress  Appearance: Normal appearance  She is not ill-appearing, toxic-appearing or diaphoretic  HENT:      Head: Normocephalic and atraumatic  Eyes:      General: No scleral icterus  Right eye: No discharge  Left eye: No discharge  Cardiovascular:      Pulses: Normal pulses  Pulmonary:      Effort: Pulmonary effort is normal    Abdominal:      General: There is no distension  Palpations: There is no mass  Genitourinary:     Comments: Deferred for the OR  Musculoskeletal:         General: No swelling  Skin:     Coloration: Skin is not jaundiced  Neurological:      General: No focal deficit present  Mental Status: She is alert and oriented to person, place, and time  Cranial Nerves: No cranial nerve deficit  Psychiatric:         Mood and Affect: Mood normal          Behavior: Behavior normal          Thought Content: Thought content normal          Judgment: Judgment normal          Lab Results: I have personally reviewed pertinent reports  Imaging: I have personally reviewed pertinent reports  EKG, Pathology, and Other Studies: I have personally reviewed pertinent reports  VTE Prophylaxis: Sequential compression device Marcelo Madrid)     Code Status: No Order  Advance Directive and Living Will:      Power of :    POLST:      Counseling / Coordination of Care  Total floor / unit time spent today 15 minutes    Greater than 50% of total time was spent with the patient and / or family counseling and / or coordination of care  A description of the counseling / coordination of care: review of today's case

## 2023-02-16 NOTE — OP NOTE
OPERATIVE REPORT  PATIENT NAME: Maira Downing    :  1990  MRN: [id number]  Pt Location: [facility] OR ROOM 04    SURGERY DATE: 2023    Surgeon(s) and Role:     * Katarzyna Tucker MD - Primary    Preop Diagnosis:  Right renal stone [N20 0]    Post-Op Diagnosis Codes:     * Right renal stone [N20 0]    Procedure(s):  Right - CYSTOSCOPY URETEROSCOPY WITH LITHOTRIPSY HOLMIUM LASER  RETROGRADE PYELOGRAM AND INSERTION STENT URETERAL    Specimen(s):  * No specimens in log *    Estimated Blood Loss:   Minimal    Drains:  * No LDAs found *    Anesthesia Type:   General    Operative Indications:  Right renal stone [N20 0]      Operative Findings:  Large renal pelvic stone noted  This is quite brittle and amenable to a dusting approach  A high-powered laser and a flexible digital scope was used to pulverize the stone into dust   The patient is stone free at the end of today's procedure  There is no damage to the collecting system  There was some edema where the stone had been, as such a 6 [place] by 24 cm right sided stent was placed with a good curl in the kidney and the bladder  The planned dwell time is 3 weeks  Complications:   None    Procedure and Technique:      PROCEDURES PERFORMED:  1) Cystoscopy  2) Right retrograde pyelography with fluoroscopic interpretation  3) Right ureteroscopy with laser lithotripsy of stone  4) Right ureteral stent placement (6F x 24cm)    SURGEON:  Katarzyna Tucker MD    ASSISTANTS:  none    NOTE:  There were no qualified teaching residents to assist with this case    ANESTHESIA: General     COMPLICATIONS:   None    ANTIBIOTICS:  ancef    INTRAOPERATIVE THROMBOEMBOLISM PROPHYLAXIS:  Pneumatic compression stockings         FINDINGS:    1  The RIGHT calculus was not radiopaque on plain fluoroscopy  2  Retrograde pyelogram was performed on the Right side using a 5 Fr open ended catheter  6 mL contrast used      3   The following findings were noted: Filling defect in the right renal pelvis consistent with stone      INDICATIONS FOR PROCEDURE:  Jojo Lieberman is an 34 y o  old female with Right renal calculus  After discussing the options for treatment, including medical expulsive therapy, extracorporeal shockwave lithotripsy, and ureteroscopy, the patient elected to undergo ureteroscopy and ureteral stent placement  We discussed the procedure in detail, the alternatives, and the risks, and they signed informed consent to proceed (these are outlined in the surgical consent form)  PROCEDURE IN DETAIL:     The patient was identified by name, date of birth, and MRN  and brought to the OR  Antibiotic prophylaxis and DVT prophylaxis were administered as per the guidelines  They were placed in the dorsal lithotomy position with care to pad all pressure points  They were prepped and draped in the usual sterile fashion  A surgical time out was performed with all in the room in agreement with the correct patient, procedure, indications, and laterality  A 21-Kiswahili rigid cystoscope was used to enter the bladder  The bladder was inspected in its entirety and there were no lesions noted  The ureteral orifices were identified in their normal orthotopic positions  The RIGHT ureteral orifice was identified and a 5 Fr open ended catheter was placed into the ureteral orifice  A retrograde pyelogram was performed with the findings as described above  A Solo wire was advanced up to the kidney under fluoroscopic guidance  A 12/14 access sheath was placed     The stone was encountered in the renal pelvis location  The stone was not noted to be impacted  A holmium laser fiber was passed through the ureteroscope and laser lithotripsy was commenced at settings of 0 4 J and 50 Hz  The stones were fragmented to very small pieces and dust       The patient was stone free after this maneuver  The ureteroscope was backed down and no stones noted, no ureteral injury         A 6 fr x 24 JJ stent was then passed up the wire  under fluoroscopic guidance into the kidney with a good curl noted in the kidney and in the bladder  The stent string was removed  The bladder was drained  All instrument counts and sponge counts were correct  The patient was placed back into the supine position, awakened from general anesthesia and brought to recovery room in stable condition  ESTIMATED BLOOD LOSS:  Minimal      DRAINS:   Ureteral Internal Stent Right ureter 6 Fr  (Active)       SPECIMENS:   * No orders in the log *     IMPLANTS:   Implant Name Type Inv  Item Serial No   Lot No  LRB No  Used Action   STENT URETERAL 6FR 24CML INLAY OPTIMA - [id number]  STENT URETERAL 6FR 24CML INLAY OPTIMA  [employer] [id number] Right 1 Implanted        COMPLICATIONS: none    DISPOSITION: PACU     PLAN:  The patient is stone free at this time  She will be discharged home  We will arrange for cystoscopy with ureteral stent removal in the office in roughly 3 weeks  She can see us 3 months later with an ultrasound and kidney function testing prior           I was present for the entire procedure and A qualified resident physician was not available    Patient Disposition:  PACU         SIGNATURE: Ignacia Johns MD  DATE: February 16, 2023  TIME: 2:33 PM

## 2023-02-17 DIAGNOSIS — N20.0 NEPHROLITHIASIS: ICD-10-CM

## 2023-02-17 RX ORDER — OXYBUTYNIN CHLORIDE 10 MG/1
10 TABLET, EXTENDED RELEASE ORAL
Qty: 7 TABLET | Refills: 0 | Status: SHIPPED | OUTPATIENT
Start: 2023-02-17 | End: 2023-02-24

## 2023-02-17 RX ORDER — DICLOFENAC POTASSIUM 50 MG/1
50 TABLET, FILM COATED ORAL 2 TIMES DAILY
Qty: 10 TABLET | Refills: 0 | Status: SHIPPED | OUTPATIENT
Start: 2023-02-17 | End: 2023-02-22

## 2023-02-17 RX ORDER — PHENAZOPYRIDINE HYDROCHLORIDE 100 MG/1
100 TABLET, FILM COATED ORAL 3 TIMES DAILY PRN
Qty: 10 TABLET | Refills: 0 | Status: SHIPPED | OUTPATIENT
Start: 2023-02-17

## 2023-02-17 NOTE — TELEPHONE ENCOUNTER
Called and spoke to patient  Patient having a lot of discomfort after URS yesterday  Patient stated the [address] did not have the oxybutynin or diclofenac potassium and is requested [facility]  Patient stated she has urinated three times since she has been home and every time she urinates she has severe pain and vomited each time  Patient utilizing in zofran but does not seem to help  Educated patient on adequate hydration with stent and avoidance of bladder irritants  Patient was taking motrin to help with pain but does not help when she goes to urinate  Informed patient I would route to provider        Patient confirmed cysto/stent removal appointment in March with Maribel Ronquillo

## 2023-02-17 NOTE — TELEPHONE ENCOUNTER
Called and spoke to patient  Informed patient medication was sent to pharmacy  Educated side effects of pyridium  Patient thankful for call and verbalized understanding

## 2023-03-22 NOTE — PROGRESS NOTES
Cystoscopy     Date/Time 3/24/2023 9:27 AM     Performed by  Yasir Razo PA-C     Authorized by Yasir Razo PA-C      Universal Protocol:  Consent: Verbal consent obtained  Written consent obtained  Consent given by: patient  Patient understanding: patient states understanding of the procedure being performed  Patient consent: the patient's understanding of the procedure matches consent given  Patient identity confirmed: verbally with patient        Procedure Details:  Procedure type: simple removal of a foreign body, stone, or stent    Patient tolerance: Patient tolerated the procedure well with no immediate complications    Additional Procedure Details: Patient prepped and draped in sterile fashion  Urojet used  Cystoscope inserted into urethra and stent was visualized  Stent grasper advanced and stent removed without difficulty         Plan:  - follow up in 3 months with US and BMP prior to visit  - Empiric antibiotics given due to urine positive for leukocytes  - ER precautions given  - Call with any questions or concerns in the meantime  - All questions answered; patient understands and agrees with plan    Yasir Razo PA-C

## 2023-03-24 ENCOUNTER — OFFICE VISIT (OUTPATIENT)
Dept: UROLOGY | Facility: CLINIC | Age: 33
End: 2023-03-24

## 2023-03-24 VITALS
HEART RATE: 71 BPM | BODY MASS INDEX: 32.27 KG/M2 | OXYGEN SATURATION: 99 % | WEIGHT: 189 LBS | SYSTOLIC BLOOD PRESSURE: 144 MMHG | HEIGHT: 64 IN | DIASTOLIC BLOOD PRESSURE: 88 MMHG

## 2023-03-24 DIAGNOSIS — N20.0 NEPHROLITHIASIS: Primary | ICD-10-CM

## 2023-03-24 LAB
SL AMB  POCT GLUCOSE, UA: NORMAL
SL AMB LEUKOCYTE ESTERASE,UA: NORMAL
SL AMB POCT BILIRUBIN,UA: NORMAL
SL AMB POCT BLOOD,UA: NORMAL
SL AMB POCT CLARITY,UA: CLEAR
SL AMB POCT COLOR,UA: YELLOW
SL AMB POCT KETONES,UA: NORMAL
SL AMB POCT NITRITE,UA: NORMAL
SL AMB POCT PH,UA: NORMAL
SL AMB POCT SPECIFIC GRAVITY,UA: 1.03
SL AMB POCT URINE PROTEIN: 0.2
SL AMB POCT UROBILINOGEN: NORMAL

## 2023-03-24 RX ORDER — CEPHALEXIN 500 MG/1
500 CAPSULE ORAL EVERY 12 HOURS SCHEDULED
Qty: 6 CAPSULE | Refills: 0 | Status: SHIPPED | OUTPATIENT
Start: 2023-03-24 | End: 2023-03-27

## 2023-05-11 ENCOUNTER — TELEPHONE (OUTPATIENT)
Dept: NEPHROLOGY | Facility: CLINIC | Age: 33
End: 2023-05-11

## 2023-05-11 NOTE — TELEPHONE ENCOUNTER
I called [phone] @ [phone] (Automated System) to check eligible for the patient and as of 5/11/2023 the patient has current active coverage as of 5/11/2023   Cinda Faulkner,

## 2023-05-16 ENCOUNTER — TELEPHONE (OUTPATIENT)
Dept: NEPHROLOGY | Facility: CLINIC | Age: 33
End: 2023-05-16

## 2023-05-16 NOTE — TELEPHONE ENCOUNTER
Called spoke with patient, per patient she has forgotten to get litholink done  patient will call office back to reschedule her appt with [facility]

## 2023-05-16 NOTE — TELEPHONE ENCOUNTER
Called [facility] to remind patient to get labs done prior to 5/23 fu appt with [facility]  also advise patient to call office to confirm if litholink was done

## 2023-06-28 ENCOUNTER — OFFICE VISIT (OUTPATIENT)
Dept: UROLOGY | Facility: CLINIC | Age: 33
End: 2023-06-28
Payer: COMMERCIAL

## 2023-06-28 VITALS
OXYGEN SATURATION: 96 % | SYSTOLIC BLOOD PRESSURE: 106 MMHG | DIASTOLIC BLOOD PRESSURE: 80 MMHG | BODY MASS INDEX: 32.27 KG/M2 | WEIGHT: 189 LBS | HEIGHT: 64 IN | HEART RATE: 82 BPM

## 2023-06-28 DIAGNOSIS — N20.0 NEPHROLITHIASIS: Primary | ICD-10-CM

## 2023-06-28 PROCEDURE — 99213 OFFICE O/P EST LOW 20 MIN: CPT | Performed by: PHYSICIAN ASSISTANT

## 2023-06-28 NOTE — PROGRESS NOTES
1. Nephrolithiasis  US kidney and bladder    XR abdomen 1 view kub        Assessment and plan:       1. Nephrolithiasis   - proper hydration and dietary modifications  - f/u 1 year KUB prior  - call sooner with any urinary concerns      Mignon Noel PA-C      Chief Complaint     Chief Complaint   Patient presents with   • Follow-up         History of Present Illness     Ladan Melo is a 28 y.o. female presenting today for follow up. CT stone study (12/13/22) with an 11mm right renal pelvic stone without hydronephrosis. S/p left ureteroscopy, holmium laser, basket extraction, retrograde pyelogram, and left ureteral stent insertion (2/26/23) with Dr. Brandon Franks. F/u US (7/11/23) negative for residual obstruction. Small bilateral stones. Laboratory     Lab Results   Component Value Date    CREATININE 0.97 12/13/2022       Review of Systems     Review of Systems   Constitutional: Negative for activity change, appetite change, chills, diaphoresis, fatigue, fever and unexpected weight change. Respiratory: Negative for chest tightness and shortness of breath. Cardiovascular: Negative for chest pain, palpitations and leg swelling. Gastrointestinal: Negative for abdominal distention, abdominal pain, constipation, diarrhea, nausea and vomiting. Genitourinary: Negative for decreased urine volume, difficulty urinating, dysuria, enuresis, flank pain, frequency, genital sores, hematuria and urgency. Musculoskeletal: Negative for back pain, gait problem and myalgias. Skin: Negative for color change, pallor, rash and wound. Psychiatric/Behavioral: Negative for behavioral problems. The patient is not nervous/anxious. Allergies     No Known Allergies    Physical Exam     Physical Exam  Constitutional:       General: She is not in acute distress. Appearance: Normal appearance. She is normal weight. She is not ill-appearing, toxic-appearing or diaphoretic.    HENT:      Head: Normocephalic and atraumatic. Eyes:      General:         Right eye: No discharge. Left eye: No discharge. Conjunctiva/sclera: Conjunctivae normal.   Pulmonary:      Effort: Pulmonary effort is normal. No respiratory distress. Musculoskeletal:         General: No swelling or tenderness. Normal range of motion. Skin:     General: Skin is warm and dry. Neurological:      General: No focal deficit present. Mental Status: She is alert and oriented to person, place, and time. Psychiatric:         Mood and Affect: Mood normal.         Behavior: Behavior normal.         Thought Content:  Thought content normal.           Vital Signs     Vitals:    06/28/23 1008   BP: 106/80   BP Location: Left arm   Patient Position: Sitting   Cuff Size: Large   Pulse: 82   SpO2: 96%   Weight: 85.7 kg (189 lb)   Height: 5' 3.5" (1.613 m)         Current Medications       Current Outpatient Medications:   •  diclofenac potassium (CATAFLAM) 50 mg tablet, Take 1 tablet (50 mg total) by mouth 2 (two) times a day for 5 days (Patient not taking: Reported on 3/24/2023), Disp: 10 tablet, Rfl: 0  •  ondansetron (ZOFRAN) 4 mg tablet, Take 1 tablet (4 mg total) by mouth every 8 (eight) hours as needed for nausea or vomiting (Patient not taking: Reported on 3/24/2023), Disp: 20 tablet, Rfl: 0  •  oxybutynin (DITROPAN-XL) 10 MG 24 hr tablet, Take 1 tablet (10 mg total) by mouth daily at bedtime for 7 days (Patient not taking: Reported on 3/24/2023), Disp: 7 tablet, Rfl: 0  •  phenazopyridine (PYRIDIUM) 100 mg tablet, Take 1 tablet (100 mg total) by mouth 3 (three) times a day as needed for bladder spasms (Patient not taking: Reported on 6/28/2023), Disp: 10 tablet, Rfl: 0  •  tamsulosin (FLOMAX) 0.4 mg, Take 1 capsule (0.4 mg total) by mouth daily with dinner (Patient not taking: Reported on 3/24/2023), Disp: 30 capsule, Rfl: 0      Active Problems     Patient Active Problem List   Diagnosis   • Nephrolithiasis         Past Medical History     Past Medical History:   Diagnosis Date   • Kidney stone    • Urinary tract infection          Surgical History     Past Surgical History:   Procedure Laterality Date   • FL RETROGRADE PYELOGRAM  2/16/2023   • OR CYSTO/URETERO W/LITHOTRIPSY &INDWELL STENT INSRT Right 2/16/2023    Procedure: CYSTOSCOPY URETEROSCOPY WITH LITHOTRIPSY HOLMIUM LASER, RETROGRADE PYELOGRAM AND INSERTION STENT URETERAL;  Surgeon: Gus Finley MD;  Location: [facility] MAIN OR;  Service: Urology   • WISDOM TOOTH EXTRACTION Bilateral 2008         Family History     Family History   Problem Relation Age of Onset   • Heart disease Mother    • Seizures Mother    • Asthma Father    • Nephrolithiasis Father          Social History     Social History       Radiology

## 2023-07-11 ENCOUNTER — HOSPITAL ENCOUNTER (OUTPATIENT)
Dept: RADIOLOGY | Facility: HOSPITAL | Age: 33
Discharge: HOME | End: 2023-07-11
Payer: COMMERCIAL

## 2023-07-11 ENCOUNTER — HOSPITAL ENCOUNTER (OUTPATIENT)
Dept: ULTRASOUND IMAGING | Facility: HOSPITAL | Age: 33
Discharge: HOME | End: 2023-07-11
Payer: COMMERCIAL

## 2023-07-11 DIAGNOSIS — N20.0 NEPHROLITHIASIS: ICD-10-CM

## 2023-07-11 PROCEDURE — 76775 US EXAM ABDO BACK WALL LIM: CPT

## 2023-07-11 PROCEDURE — 74018 RADEX ABDOMEN 1 VIEW: CPT

## 2023-07-17 ENCOUNTER — TELEPHONE (OUTPATIENT)
Dept: UROLOGY | Facility: CLINIC | Age: 33
End: 2023-07-17

## 2023-07-17 DIAGNOSIS — N20.0 NEPHROLITHIASIS: Primary | ICD-10-CM

## 2023-07-17 NOTE — TELEPHONE ENCOUNTER
----- Message from Tye Marx Dr sent at 7/17/2023  2:57 PM EDT -----  Please let patient know her recent ultrasound the kidney and bladder reveals small stones in both her kidneys. No stones in her ureters. No swelling of her kidneys.   She should follow-up in 1 year as recommended with imaging prior

## 2023-07-17 NOTE — TELEPHONE ENCOUNTER
Spoke with patient, advised results, no further questions. I entered orders for her to have imaging in 1 year.

## 2023-07-17 NOTE — RESULT ENCOUNTER NOTE
Please let patient know her recent ultrasound the kidney and bladder reveals small stones in both her kidneys. No stones in her ureters. No swelling of her kidneys.   She should follow-up in 1 year as recommended with imaging prior

## (undated) DEVICE — STERILE SURGICAL LUBRICANT,  TUBE: Brand: SURGILUBE

## (undated) DEVICE — CHLORHEXIDINE 4PCT 4 OZ

## (undated) DEVICE — FIBER STD QUANTA 272 MICRON

## (undated) DEVICE — SINGLE-USE DIGITAL FLEXIBLE URETEROSCOPE: Brand: APTRA

## (undated) DEVICE — SPECIMEN CONTAINER STERILE PEEL PACK

## (undated) DEVICE — UROCATCH BAG

## (undated) DEVICE — PACK TUR

## (undated) DEVICE — PREMIUM DRY TRAY LF: Brand: MEDLINE INDUSTRIES, INC.

## (undated) DEVICE — GLOVE INDICATOR PI UNDERGLOVE SZ 8 BLUE

## (undated) DEVICE — SHEATH URETERAL ACCESS 12/14FR 35CM PROXIS

## (undated) DEVICE — CATH URETERAL 5FR X 70 CM FLEX TIP POLYUR BARD

## (undated) DEVICE — INVIEW CLEAR LEGGINGS: Brand: CONVERTORS

## (undated) DEVICE — GLOVE SRG BIOGEL ECLIPSE 7.5

## (undated) DEVICE — GUIDEWIRE STRGHT TIP 0.035 IN  SOLO PLUS